# Patient Record
Sex: FEMALE | Race: WHITE | NOT HISPANIC OR LATINO
[De-identification: names, ages, dates, MRNs, and addresses within clinical notes are randomized per-mention and may not be internally consistent; named-entity substitution may affect disease eponyms.]

---

## 2017-04-07 ENCOUNTER — TRANSCRIPTION ENCOUNTER (OUTPATIENT)
Age: 62
End: 2017-04-07

## 2017-04-10 PROBLEM — Z00.00 ENCOUNTER FOR PREVENTIVE HEALTH EXAMINATION: Status: ACTIVE | Noted: 2017-04-10

## 2017-04-11 ENCOUNTER — APPOINTMENT (OUTPATIENT)
Dept: CARDIOLOGY | Facility: CLINIC | Age: 62
End: 2017-04-11

## 2017-05-03 ENCOUNTER — APPOINTMENT (OUTPATIENT)
Dept: CARDIOLOGY | Facility: CLINIC | Age: 62
End: 2017-05-03

## 2017-05-24 ENCOUNTER — APPOINTMENT (OUTPATIENT)
Dept: CARDIOLOGY | Facility: CLINIC | Age: 62
End: 2017-05-24

## 2017-10-20 ENCOUNTER — APPOINTMENT (OUTPATIENT)
Age: 62
End: 2017-10-20
Payer: COMMERCIAL

## 2017-10-20 PROCEDURE — 99214 OFFICE O/P EST MOD 30 MIN: CPT

## 2017-10-20 PROCEDURE — 93000 ELECTROCARDIOGRAM COMPLETE: CPT

## 2017-10-30 ENCOUNTER — APPOINTMENT (OUTPATIENT)
Dept: CARDIOLOGY | Facility: CLINIC | Age: 62
End: 2017-10-30
Payer: COMMERCIAL

## 2017-10-30 PROCEDURE — A9502: CPT

## 2017-10-30 PROCEDURE — 93015 CV STRESS TEST SUPVJ I&R: CPT

## 2017-10-30 PROCEDURE — 78452 HT MUSCLE IMAGE SPECT MULT: CPT

## 2017-11-13 ENCOUNTER — RECORD ABSTRACTING (OUTPATIENT)
Age: 62
End: 2017-11-13

## 2017-11-13 DIAGNOSIS — Z78.9 OTHER SPECIFIED HEALTH STATUS: ICD-10-CM

## 2017-11-13 RX ORDER — ALPRAZOLAM 0.25 MG/1
0.25 TABLET ORAL
Refills: 0 | Status: ACTIVE | COMMUNITY

## 2017-12-06 ENCOUNTER — TRANSCRIPTION ENCOUNTER (OUTPATIENT)
Age: 62
End: 2017-12-06

## 2017-12-18 ENCOUNTER — RECORD ABSTRACTING (OUTPATIENT)
Age: 62
End: 2017-12-18

## 2017-12-20 ENCOUNTER — APPOINTMENT (OUTPATIENT)
Dept: CARDIOLOGY | Facility: CLINIC | Age: 62
End: 2017-12-20
Payer: COMMERCIAL

## 2017-12-20 VITALS
SYSTOLIC BLOOD PRESSURE: 120 MMHG | HEIGHT: 68 IN | HEART RATE: 90 BPM | DIASTOLIC BLOOD PRESSURE: 80 MMHG | WEIGHT: 226 LBS | BODY MASS INDEX: 34.25 KG/M2 | OXYGEN SATURATION: 97 %

## 2017-12-20 DIAGNOSIS — Z85.850 PERSONAL HISTORY OF MALIGNANT NEOPLASM OF THYROID: ICD-10-CM

## 2017-12-20 PROCEDURE — 99214 OFFICE O/P EST MOD 30 MIN: CPT

## 2017-12-20 PROCEDURE — 93224 XTRNL ECG REC UP TO 48 HRS: CPT

## 2017-12-22 ENCOUNTER — RX RENEWAL (OUTPATIENT)
Age: 62
End: 2017-12-22

## 2017-12-22 ENCOUNTER — MEDICATION RENEWAL (OUTPATIENT)
Age: 62
End: 2017-12-22

## 2018-05-08 ENCOUNTER — RX RENEWAL (OUTPATIENT)
Age: 63
End: 2018-05-08

## 2018-06-07 ENCOUNTER — TRANSCRIPTION ENCOUNTER (OUTPATIENT)
Age: 63
End: 2018-06-07

## 2018-06-08 ENCOUNTER — OTHER (OUTPATIENT)
Age: 63
End: 2018-06-08

## 2018-07-12 ENCOUNTER — OUTPATIENT (OUTPATIENT)
Dept: OUTPATIENT SERVICES | Facility: HOSPITAL | Age: 63
LOS: 1 days | End: 2018-07-12
Payer: COMMERCIAL

## 2018-07-12 ENCOUNTER — APPOINTMENT (OUTPATIENT)
Dept: GASTROENTEROLOGY | Facility: HOSPITAL | Age: 63
End: 2018-07-12

## 2018-07-12 DIAGNOSIS — R13.10 DYSPHAGIA, UNSPECIFIED: ICD-10-CM

## 2018-07-12 PROCEDURE — 91037 ESOPH IMPED FUNCTION TEST: CPT | Mod: 26

## 2018-07-12 PROCEDURE — 91010 ESOPHAGUS MOTILITY STUDY: CPT

## 2018-07-12 PROCEDURE — 91010 ESOPHAGUS MOTILITY STUDY: CPT | Mod: 26

## 2018-07-13 ENCOUNTER — OTHER (OUTPATIENT)
Age: 63
End: 2018-07-13

## 2018-08-07 ENCOUNTER — APPOINTMENT (OUTPATIENT)
Dept: CARDIOLOGY | Facility: CLINIC | Age: 63
End: 2018-08-07
Payer: COMMERCIAL

## 2018-08-07 VITALS
BODY MASS INDEX: 33.34 KG/M2 | DIASTOLIC BLOOD PRESSURE: 72 MMHG | HEART RATE: 82 BPM | OXYGEN SATURATION: 99 % | SYSTOLIC BLOOD PRESSURE: 128 MMHG | WEIGHT: 220 LBS | HEIGHT: 68 IN

## 2018-08-07 DIAGNOSIS — R13.10 DYSPHAGIA, UNSPECIFIED: ICD-10-CM

## 2018-08-07 PROCEDURE — 99214 OFFICE O/P EST MOD 30 MIN: CPT

## 2018-08-07 PROCEDURE — 93000 ELECTROCARDIOGRAM COMPLETE: CPT

## 2018-09-04 ENCOUNTER — RX RENEWAL (OUTPATIENT)
Age: 63
End: 2018-09-04

## 2019-07-17 ENCOUNTER — OUTPATIENT (OUTPATIENT)
Dept: OUTPATIENT SERVICES | Facility: HOSPITAL | Age: 64
LOS: 1 days | End: 2019-07-17

## 2019-11-26 ENCOUNTER — TRANSCRIPTION ENCOUNTER (OUTPATIENT)
Age: 64
End: 2019-11-26

## 2020-03-05 ENCOUNTER — APPOINTMENT (OUTPATIENT)
Dept: CARDIOLOGY | Facility: CLINIC | Age: 65
End: 2020-03-05
Payer: MEDICARE

## 2020-03-05 ENCOUNTER — NON-APPOINTMENT (OUTPATIENT)
Age: 65
End: 2020-03-05

## 2020-03-05 VITALS
HEIGHT: 68 IN | BODY MASS INDEX: 33.65 KG/M2 | OXYGEN SATURATION: 98 % | WEIGHT: 222 LBS | DIASTOLIC BLOOD PRESSURE: 78 MMHG | HEART RATE: 83 BPM | SYSTOLIC BLOOD PRESSURE: 124 MMHG

## 2020-03-05 PROCEDURE — 93000 ELECTROCARDIOGRAM COMPLETE: CPT

## 2020-03-05 PROCEDURE — 99214 OFFICE O/P EST MOD 30 MIN: CPT

## 2020-03-05 RX ORDER — MELOXICAM 15 MG/1
15 TABLET ORAL DAILY
Refills: 0 | Status: DISCONTINUED | COMMUNITY
End: 2020-03-05

## 2020-03-05 RX ORDER — MELOXICAM 15 MG/1
15 TABLET ORAL
Qty: 90 | Refills: 3 | Status: DISCONTINUED | COMMUNITY
Start: 2018-09-04 | End: 2020-03-05

## 2020-03-05 NOTE — PHYSICAL EXAM
[Normal Appearance] : normal appearance [Eyelids - No Xanthelasma] : the eyelids demonstrated no xanthelasmas [No Oral Pallor] : no oral pallor [] : no respiratory distress [Respiration, Rhythm And Depth] : normal respiratory rhythm and effort [Heart Rate And Rhythm] : heart rate and rhythm were normal [Heart Sounds] : normal S1 and S2 [Bowel Sounds] : normal bowel sounds [Abdomen Soft] : soft [Abnormal Walk] : normal gait [Petechial Hemorrhages (___cm)] : no petechial hemorrhages [No Venous Stasis] : no venous stasis [Oriented To Time, Place, And Person] : oriented to person, place, and time

## 2020-03-06 NOTE — HISTORY OF PRESENT ILLNESS
[FreeTextEntry1] : AGUEDA HICKMAN  is a 64 year old  F\par \par History of thyroid cancer status post thyroidectomy, overweight status, hypertension, hyperlipidemia, atherosclerosis, premature beats.  \par She follows with rheumatologist and endocrinologist.  \par Previously saw Dr. Valverde. \par Reports longstanding hypertension and prior cholesterol over 300.  \par \par There is no prior history of a clinical myocardial infarction, coronary revascularization. \par There is no history of symptomatic congestive heart failure rheumatic heart disease or valvular disease.\par There is no history of symptomatic arrhythmias including atrial fibrillation.\par \par Has dyspnea on exertion when climbing stairs.  \par Multiple contacts have had significant cardiovascular disease.  \par Requesting a follow-up CT scan (had study over 10 y ago)\par There is no exertional chest pain, pressure or discomfort. \par There are no symptomatic palpitations, lightheadedness, dizziness or syncope.\par \par Blood work 2019 total cholesterol 195, LDL 9 0, hemoglobin 14.7, creatinine 0.9.\par Family history is unknown as both of her parents  at a young age.\par \par Holter '17 premature beats \par Prior nuclear stress test is noted overall limited functional status \par Carotid Doppler  mild atherosclerosis \par Echocardiogram May 2017 with normal left ventricular function

## 2020-03-06 NOTE — ASSESSMENT
[FreeTextEntry1] : Dyspnea\par HTN\par HL\par Limited stress\par CTA to rule out obstructive coronary disease.  \par Echocardiogram.  \par Requested labs\par Continue beta blocker and statin\par Monitor thyroid

## 2020-03-06 NOTE — REVIEW OF SYSTEMS
[Eyeglasses] : currently wearing eyeglasses [Shortness Of Breath] : shortness of breath [see HPI] : see HPI [Dyspnea on exertion] : dyspnea during exertion [Negative] : Respiratory [Chest Pain] : no chest pain [Cough] : no cough

## 2020-03-13 ENCOUNTER — APPOINTMENT (OUTPATIENT)
Dept: CARDIOLOGY | Facility: CLINIC | Age: 65
End: 2020-03-13
Payer: MEDICARE

## 2020-03-13 ENCOUNTER — APPOINTMENT (OUTPATIENT)
Dept: CARDIOLOGY | Facility: CLINIC | Age: 65
End: 2020-03-13

## 2020-03-13 PROCEDURE — 93979 VASCULAR STUDY: CPT

## 2020-03-13 PROCEDURE — 93306 TTE W/DOPPLER COMPLETE: CPT

## 2020-04-22 ENCOUNTER — APPOINTMENT (OUTPATIENT)
Dept: CARDIOLOGY | Facility: CLINIC | Age: 65
End: 2020-04-22

## 2020-05-07 ENCOUNTER — TRANSCRIPTION ENCOUNTER (OUTPATIENT)
Age: 65
End: 2020-05-07

## 2020-07-31 ENCOUNTER — OUTPATIENT (OUTPATIENT)
Dept: OUTPATIENT SERVICES | Facility: HOSPITAL | Age: 65
LOS: 1 days | End: 2020-07-31

## 2020-09-15 ENCOUNTER — APPOINTMENT (OUTPATIENT)
Dept: CARDIOLOGY | Facility: CLINIC | Age: 65
End: 2020-09-15
Payer: MEDICARE

## 2020-09-15 VITALS
DIASTOLIC BLOOD PRESSURE: 70 MMHG | HEIGHT: 68 IN | SYSTOLIC BLOOD PRESSURE: 122 MMHG | WEIGHT: 225 LBS | OXYGEN SATURATION: 98 % | BODY MASS INDEX: 34.1 KG/M2 | HEART RATE: 74 BPM

## 2020-09-15 DIAGNOSIS — R00.2 PALPITATIONS: ICD-10-CM

## 2020-09-15 PROCEDURE — 99214 OFFICE O/P EST MOD 30 MIN: CPT

## 2020-09-15 NOTE — ASSESSMENT
[FreeTextEntry1] : AGUEDA HICKMAN is a 65 year old F who presents today Sep 15, 2020 with the above history and the following active issues:\par \par \par Dyspnea liekly related to either underlying pulm dz or deconditioning. Discussed incidental findings on CT scan with patient and advised f/u with PCP. Verbalized an understanding.\par HTN: /70 on my exam. Continue Dyazide and Toprol.\par HLD; Continue Atorvastatin 40mg daily. \par Nonobstructive CAD noted on CT scan, calcium score of 6. Continue statin as above and start ASA 81mg daily.  \par Requested labs\par Continue beta blocker and statin\par Monitor thyroid.\par \par F/U in 6 months.\par Discussed red flag symptoms, which would warrant sooner or emergent medical evaluation.\par Any questions and concerns were addressed and resolved.\par \par Sincerely,\par Dottie Hammer FNP-BC\par Patient's history, testing, and plan was reviewed with supervising physician, Dr. Aiden Chapman

## 2020-09-15 NOTE — PHYSICAL EXAM
[Normal Appearance] : normal appearance [Eyelids - No Xanthelasma] : the eyelids demonstrated no xanthelasmas [No Oral Pallor] : no oral pallor [Normal Jugular Venous A Waves Present] : normal jugular venous A waves present [Normal Jugular Venous V Waves Present] : normal jugular venous V waves present [No Jugular Venous Joseph A Waves] : no jugular venous joseph A waves [] : no respiratory distress [Respiration, Rhythm And Depth] : normal respiratory rhythm and effort [Heart Rate And Rhythm] : heart rate and rhythm were normal [Heart Sounds] : normal S1 and S2 [Bowel Sounds] : normal bowel sounds [Abdomen Soft] : soft [Petechial Hemorrhages (___cm)] : no petechial hemorrhages [Abnormal Walk] : normal gait [No Venous Stasis] : no venous stasis [Oriented To Time, Place, And Person] : oriented to person, place, and time

## 2020-09-15 NOTE — REVIEW OF SYSTEMS
[Eyeglasses] : currently wearing eyeglasses [see HPI] : see HPI [Dyspnea on exertion] : dyspnea during exertion [Shortness Of Breath] : shortness of breath [Palpitations] : palpitations [Negative] : Heme/Lymph [Chest Pain] : no chest pain [Cough] : no cough

## 2020-09-15 NOTE — HISTORY OF PRESENT ILLNESS
[FreeTextEntry1] : AGUEDA HICKMAN  is a 65 year old  F\par \par History of thyroid cancer status post thyroidectomy, overweight status, hypertension, hyperlipidemia, atherosclerosis, premature beats.  \par She follows with rheumatologist and endocrinologist.  \par Previously saw Dr. Valverde. \par Reports longstanding hypertension and prior cholesterol over 300.  \par \par There is no prior history of a clinical myocardial infarction, coronary revascularization. \par There is no history of symptomatic congestive heart failure rheumatic heart disease or valvular disease.\par There is no history of symptomatic arrhythmias including atrial fibrillation.\par \par \par Multiple contacts have had significant cardiovascular disease.  \par Requesting a follow-up CT scan (had study over 10 y ago).\par \par Last seen 3/5/2020. In the interim there have been no hospitalizations or procedures. Presents today to review CTA, echo, and abd u/s. There is OLEARY with climbing stairs and sporadic brief palpitations. Palpitations are relieved with Xanax. She denies chest pain, pressure, unusual shortness of breath, orthopnea, LE edema, lightheadedness, dizziness, near syncope or syncope. Not on a formal exercise regimen.\par \par Testing:\par \par CTA 20: Calcium score 6. Nonobstructive CAD. Calcified plaque of the mid LAD results in minimal stenosis approx 20% by area. Additional areas of minimal noncalcified plaque of the RCA and left circumflex/dominant OM branches. Peripheral reticulated, groundlass, and scarring of the lower lobes, right greater than left. These findings may be related to chronic interstitial lung dz. A 0.6 cm nodule of the right lung adjacent to the minor fissure may represent an intrafissural node. Follow up chest CT can be obtained in 6 months to evaluate for stability of these findings (discussed with patient today).\par \par Echo 3/13/20: EF 55-60%. Minimal MR. Mild DD. Mild TR. Minimal OH.\par \par ABD u/s 3/13/20: Moderate atherosclerosis. No aneurysm.\par \par \par \par Blood work 2019 total cholesterol 195, LDL 9 0, hemoglobin 14.7, creatinine 0.9.\par Family history is unknown as both of her parents  at a young age.\par \par Holter '17 premature beats \par Prior nuclear stress test is noted overall limited functional status \par Carotid Doppler  mild atherosclerosis \par Echocardiogram May 2017 with normal left ventricular function

## 2021-03-10 ENCOUNTER — OUTPATIENT (OUTPATIENT)
Dept: OUTPATIENT SERVICES | Facility: HOSPITAL | Age: 66
LOS: 1 days | End: 2021-03-10

## 2021-06-18 ENCOUNTER — OUTPATIENT (OUTPATIENT)
Dept: OUTPATIENT SERVICES | Facility: HOSPITAL | Age: 66
LOS: 1 days | End: 2021-06-18

## 2021-07-29 ENCOUNTER — TRANSCRIPTION ENCOUNTER (OUTPATIENT)
Age: 66
End: 2021-07-29

## 2021-08-12 ENCOUNTER — INPATIENT (INPATIENT)
Facility: HOSPITAL | Age: 66
LOS: 3 days | Discharge: HOME | End: 2021-08-16
Attending: EMERGENCY MEDICINE
Payer: MEDICARE

## 2021-08-12 PROCEDURE — 74177 CT ABD & PELVIS W/CONTRAST: CPT | Mod: 26

## 2021-08-12 PROCEDURE — 99285 EMERGENCY DEPT VISIT HI MDM: CPT

## 2021-08-15 PROCEDURE — 74177 CT ABD & PELVIS W/CONTRAST: CPT | Mod: 26

## 2021-09-07 ENCOUNTER — OUTPATIENT (OUTPATIENT)
Dept: OUTPATIENT SERVICES | Facility: HOSPITAL | Age: 66
LOS: 1 days | End: 2021-09-07

## 2022-03-14 ENCOUNTER — EMERGENCY (EMERGENCY)
Facility: HOSPITAL | Age: 67
LOS: 1 days | Discharge: HOME | End: 2022-03-14
Admitting: EMERGENCY MEDICINE
Payer: MEDICARE

## 2022-03-14 PROCEDURE — 74177 CT ABD & PELVIS W/CONTRAST: CPT | Mod: 26

## 2022-03-14 PROCEDURE — 99285 EMERGENCY DEPT VISIT HI MDM: CPT

## 2022-03-17 DIAGNOSIS — N39.0 URINARY TRACT INFECTION, SITE NOT SPECIFIED: ICD-10-CM

## 2022-03-17 DIAGNOSIS — R10.32 LEFT LOWER QUADRANT PAIN: ICD-10-CM

## 2022-04-01 ENCOUNTER — TRANSCRIPTION ENCOUNTER (OUTPATIENT)
Age: 67
End: 2022-04-01

## 2022-04-04 ENCOUNTER — APPOINTMENT (OUTPATIENT)
Dept: ORTHOPEDIC SURGERY | Facility: CLINIC | Age: 67
End: 2022-04-04

## 2022-04-04 DIAGNOSIS — M23.92 UNSPECIFIED INTERNAL DERANGEMENT OF LEFT KNEE: ICD-10-CM

## 2022-04-04 DIAGNOSIS — M23.91 UNSPECIFIED INTERNAL DERANGEMENT OF RIGHT KNEE: ICD-10-CM

## 2022-04-04 PROCEDURE — 99213 OFFICE O/P EST LOW 20 MIN: CPT

## 2022-04-04 PROCEDURE — 73562 X-RAY EXAM OF KNEE 3: CPT | Mod: LT

## 2022-04-06 NOTE — HISTORY OF PRESENT ILLNESS
[8] : 8 [5] : 5 [Dull/Aching] : dull/aching [Constant] : constant [Ice] : ice [Standing] : standing [Walking] : walking [Stairs] : stairs [Full time] : Work status: full time [de-identified] : Pt presents with left knee pain following a fall 4/1- reports going to Urgent Care but did not have Xrays. Reports taking Advil with no relief  [] : no [FreeTextEntry7] : up and down leg.  [de-identified] :

## 2022-04-06 NOTE — PHYSICAL EXAM
[Left] : left knee [NL (0)] : extension 0 degrees [] : pain with extension [AP] : anteroposterior [Lateral] : lateral [[place]] : skyline [de-identified] : Not tested due to pain  [TWNoteComboBox7] : flexion 100 degrees [FreeTextEntry9] : DJD, lucency through femoral condyle

## 2022-05-02 ENCOUNTER — APPOINTMENT (OUTPATIENT)
Dept: ORTHOPEDIC SURGERY | Facility: CLINIC | Age: 67
End: 2022-05-02

## 2022-12-21 NOTE — ADDENDUM
[FreeTextEntry1] : Please note the patient was seen and examined with NP Dottie Hammer.\par I was physically present during the service of the patient and personally examined the patient. \par I was directly involved in the management plan and recommendations of the care provided to the patient. \par I personally reviewed the history and physical examination as documented by the NP above.\par 09/15/2020\par \par 
Yes

## 2023-03-30 ENCOUNTER — APPOINTMENT (OUTPATIENT)
Dept: CARDIOLOGY | Facility: CLINIC | Age: 68
End: 2023-03-30
Payer: MEDICARE

## 2023-03-30 VITALS
HEIGHT: 68 IN | WEIGHT: 229 LBS | HEART RATE: 84 BPM | RESPIRATION RATE: 14 BRPM | TEMPERATURE: 97.9 F | OXYGEN SATURATION: 98 % | SYSTOLIC BLOOD PRESSURE: 122 MMHG | BODY MASS INDEX: 34.71 KG/M2 | DIASTOLIC BLOOD PRESSURE: 90 MMHG

## 2023-03-30 DIAGNOSIS — R53.82 CHRONIC FATIGUE, UNSPECIFIED: ICD-10-CM

## 2023-03-30 DIAGNOSIS — R09.89 OTHER SPECIFIED SYMPTOMS AND SIGNS INVOLVING THE CIRCULATORY AND RESPIRATORY SYSTEMS: ICD-10-CM

## 2023-03-30 DIAGNOSIS — E03.9 HYPOTHYROIDISM, UNSPECIFIED: ICD-10-CM

## 2023-03-30 DIAGNOSIS — Z85.850 PERSONAL HISTORY OF MALIGNANT NEOPLASM OF THYROID: ICD-10-CM

## 2023-03-30 PROCEDURE — 93000 ELECTROCARDIOGRAM COMPLETE: CPT

## 2023-03-30 PROCEDURE — 99214 OFFICE O/P EST MOD 30 MIN: CPT

## 2023-03-30 RX ORDER — LEVOTHYROXINE SODIUM 0.12 MG/1
125 TABLET ORAL DAILY
Qty: 90 | Refills: 1 | Status: ACTIVE | COMMUNITY
Start: 1900-01-01 | End: 1900-01-01

## 2023-03-30 NOTE — ASSESSMENT
[FreeTextEntry1] : AGUEDA HICKMAN is a 67 year old F\par \par Dyspnea liekly related to either underlying pulm dz or deconditioning/wt.  Pulmonary consultation: recommended again\par \par HTN: BP slightly high diastolic. Target BP d/w pt. Continue Dyazide and Toprol.\par \par HLD; Continue Atorvastatin 40mg daily. And Zetia. Check Labs -lab slip given to the patient.\par \par Nonobstructive CAD noted on CT scan, calcium score of 6. Continue statin as above and start ASA 81mg daily.  \par Continue beta blocker and statin\par \par Monitor thyroid.  Check TSH.\par \par Echo for continuing OLEARY.  She no longer has chest pains\par \par FU after echocardiogram.  Dietary changes and lifestyle changes were discussed to lose weight.. \par \par Thank you for this referral and allowing me to participate in the care of this patient.  If I can be of any further help or  if you have any questions, please do not hesitate to contact me\par \par \par Sincerely,\par \par Norris Hogue MD, FACC, [facility]\par \par

## 2023-03-30 NOTE — ADDENDUM
[FreeTextEntry1] : Please note the patient was seen and examined with NP Dottie Hammer.\par I was physically present during the service of the patient and personally examined the patient. \par I was directly involved in the management plan and recommendations of the care provided to the patient. \par I personally reviewed the history and physical examination as documented by the NP above.\par 09/15/2020\par \par

## 2023-03-30 NOTE — HISTORY OF PRESENT ILLNESS
[FreeTextEntry1] : AGUEDA HICKMAN  is a 67 year old  F\par \par History of thyroid cancer status post thyroidectomy, overweight status BMI 34), hypertension, hyperlipidemia, atherosclerosis, premature beats.  \par \par Reports longstanding hypertension and prior cholesterol over 300.  \par \par There is no prior history of a clinical myocardial infarction, coronary revascularization. \par There is no history of symptomatic congestive heart failure rheumatic heart disease or valvular disease.\par There is no history of symptomatic arrhythmias including atrial fibrillation.\par \par \par In 2020, she had CTA, echo and abdominal ultrasound.  They are reported below.\par There is OLEARY with climbing stairs and sporadic brief palpitations. Palpitations are relieved with Xanax. She denies chest pain, pressure, unusual shortness of breath, orthopnea, LE edema, lightheadedness, dizziness, near syncope or syncope. Not on a formal exercise regimen.\par \par Reviewed Testing:\par \par CTA 8/28/20: Calcium score 6. Nonobstructive CAD. Calcified plaque of the mid LAD results in minimal stenosis approx 20% by area. Additional areas of minimal noncalcified plaque of the RCA and left circumflex/dominant OM branches. Peripheral reticulated, groundlass, and scarring of the lower lobes, right greater than left. These findings may be related to chronic interstitial lung dz. A 0.6 cm nodule of the right lung adjacent to the minor fissure may represent an intrafissural node. Follow up chest CT was recommended.\par \par Echo 3/13/20: EF 55-60%. Minimal MR. Mild DD. Mild TR. Minimal [place].\par \par ABD u/s 3/13/20: Moderate atherosclerosis. No aneurysm.\par \par Prior nuclear stress test  in 2017 is noted overall limited functional status \par \par Carotid Doppler 2017 mild atherosclerosis \par \par

## 2023-03-30 NOTE — PHYSICAL EXAM
[Normal Appearance] : normal appearance [Eyelids - No Xanthelasma] : the eyelids demonstrated no xanthelasmas [No Oral Pallor] : no oral pallor [Normal Jugular Venous A Waves Present] : normal jugular venous A waves present [Normal Jugular Venous V Waves Present] : normal jugular venous V waves present [No Jugular Venous Joseph A Waves] : no jugular venous joseph A waves [] : no respiratory distress [Respiration, Rhythm And Depth] : normal respiratory rhythm and effort [Heart Rate And Rhythm] : heart rate and rhythm were normal [Heart Sounds] : normal S1 and S2 [Bowel Sounds] : normal bowel sounds [Abdomen Soft] : soft [Abnormal Walk] : normal gait [Petechial Hemorrhages (___cm)] : no petechial hemorrhages [No Venous Stasis] : no venous stasis [Oriented To Time, Place, And Person] : oriented to person, place, and time

## 2023-04-19 ENCOUNTER — APPOINTMENT (OUTPATIENT)
Dept: CARDIOLOGY | Facility: CLINIC | Age: 68
End: 2023-04-19
Payer: MEDICARE

## 2023-04-19 PROCEDURE — 93306 TTE W/DOPPLER COMPLETE: CPT

## 2023-10-26 ENCOUNTER — APPOINTMENT (OUTPATIENT)
Dept: CARDIOLOGY | Facility: CLINIC | Age: 68
End: 2023-10-26
Payer: MEDICARE

## 2023-10-26 VITALS
OXYGEN SATURATION: 99 % | BODY MASS INDEX: 34.4 KG/M2 | SYSTOLIC BLOOD PRESSURE: 124 MMHG | RESPIRATION RATE: 14 BRPM | HEART RATE: 88 BPM | DIASTOLIC BLOOD PRESSURE: 78 MMHG | WEIGHT: 227 LBS | HEIGHT: 68 IN

## 2023-10-26 DIAGNOSIS — E78.5 HYPERLIPIDEMIA, UNSPECIFIED: ICD-10-CM

## 2023-10-26 DIAGNOSIS — I49.49 OTHER PREMATURE DEPOLARIZATION: ICD-10-CM

## 2023-10-26 DIAGNOSIS — Z87.898 PERSONAL HISTORY OF OTHER SPECIFIED CONDITIONS: ICD-10-CM

## 2023-10-26 DIAGNOSIS — F41.1 GENERALIZED ANXIETY DISORDER: ICD-10-CM

## 2023-10-26 DIAGNOSIS — R06.09 OTHER FORMS OF DYSPNEA: ICD-10-CM

## 2023-10-26 DIAGNOSIS — I10 ESSENTIAL (PRIMARY) HYPERTENSION: ICD-10-CM

## 2023-10-26 PROCEDURE — 93308 TTE F-UP OR LMTD: CPT

## 2023-10-26 PROCEDURE — 99214 OFFICE O/P EST MOD 30 MIN: CPT

## 2023-10-26 RX ORDER — TRIAMTERENE AND HYDROCHLOROTHIAZIDE 37.5; 25 MG/1; MG/1
37.5-25 CAPSULE ORAL DAILY
Qty: 90 | Refills: 3 | Status: ACTIVE | COMMUNITY
Start: 1900-01-01 | End: 1900-01-01

## 2023-10-26 RX ORDER — METOPROLOL SUCCINATE 50 MG/1
50 TABLET, EXTENDED RELEASE ORAL DAILY
Qty: 90 | Refills: 3 | Status: ACTIVE | COMMUNITY
Start: 1900-01-01 | End: 1900-01-01

## 2023-10-26 RX ORDER — EZETIMIBE 10 MG/1
10 TABLET ORAL
Qty: 90 | Refills: 3 | Status: ACTIVE | COMMUNITY
Start: 2020-09-16 | End: 1900-01-01

## 2023-10-26 RX ORDER — ATORVASTATIN CALCIUM 40 MG/1
40 TABLET, FILM COATED ORAL
Qty: 90 | Refills: 3 | Status: ACTIVE | COMMUNITY
Start: 1900-01-01 | End: 1900-01-01

## 2024-04-04 ENCOUNTER — APPOINTMENT (OUTPATIENT)
Dept: CARDIOLOGY | Facility: CLINIC | Age: 69
End: 2024-04-04

## 2024-07-15 ENCOUNTER — APPOINTMENT (OUTPATIENT)
Dept: CARDIOLOGY | Facility: CLINIC | Age: 69
End: 2024-07-15

## 2025-02-13 ENCOUNTER — NON-APPOINTMENT (OUTPATIENT)
Age: 70
End: 2025-02-13

## 2025-06-19 ENCOUNTER — APPOINTMENT (OUTPATIENT)
Dept: CARDIOLOGY | Facility: CLINIC | Age: 70
End: 2025-06-19
Payer: MEDICARE

## 2025-06-19 VITALS
SYSTOLIC BLOOD PRESSURE: 122 MMHG | DIASTOLIC BLOOD PRESSURE: 72 MMHG | BODY MASS INDEX: 33.34 KG/M2 | HEIGHT: 68 IN | WEIGHT: 220 LBS | HEART RATE: 94 BPM | OXYGEN SATURATION: 97 %

## 2025-06-19 PROBLEM — I49.1 PAC (PREMATURE ATRIAL CONTRACTION): Status: ACTIVE | Noted: 2025-06-19

## 2025-06-19 PROBLEM — I25.10 CORONARY ARTERY DISEASE INVOLVING NATIVE CORONARY ARTERY OF NATIVE HEART WITHOUT ANGINA PECTORIS: Status: ACTIVE | Noted: 2025-06-19

## 2025-06-19 PROCEDURE — 93000 ELECTROCARDIOGRAM COMPLETE: CPT

## 2025-06-19 PROCEDURE — 99214 OFFICE O/P EST MOD 30 MIN: CPT

## 2025-08-07 ENCOUNTER — NON-APPOINTMENT (OUTPATIENT)
Age: 70
End: 2025-08-07

## 2025-08-07 ENCOUNTER — APPOINTMENT (OUTPATIENT)
Dept: CARDIOLOGY | Facility: CLINIC | Age: 70
End: 2025-08-07
Payer: MEDICARE

## 2025-08-07 VITALS
OXYGEN SATURATION: 98 % | BODY MASS INDEX: 34.97 KG/M2 | DIASTOLIC BLOOD PRESSURE: 70 MMHG | WEIGHT: 230 LBS | SYSTOLIC BLOOD PRESSURE: 128 MMHG | HEART RATE: 90 BPM

## 2025-08-07 DIAGNOSIS — I25.10 ATHEROSCLEROTIC HEART DISEASE OF NATIVE CORONARY ARTERY W/OUT ANGINA PECTORIS: ICD-10-CM

## 2025-08-07 DIAGNOSIS — I10 ESSENTIAL (PRIMARY) HYPERTENSION: ICD-10-CM

## 2025-08-07 DIAGNOSIS — E78.5 HYPERLIPIDEMIA, UNSPECIFIED: ICD-10-CM

## 2025-08-07 PROCEDURE — 99214 OFFICE O/P EST MOD 30 MIN: CPT

## 2025-08-07 PROCEDURE — 93000 ELECTROCARDIOGRAM COMPLETE: CPT

## 2025-08-07 PROCEDURE — 93306 TTE W/DOPPLER COMPLETE: CPT

## 2025-09-02 ENCOUNTER — NON-APPOINTMENT (OUTPATIENT)
Age: 70
End: 2025-09-02